# Patient Record
Sex: FEMALE | Race: WHITE | URBAN - METROPOLITAN AREA
[De-identification: names, ages, dates, MRNs, and addresses within clinical notes are randomized per-mention and may not be internally consistent; named-entity substitution may affect disease eponyms.]

---

## 2022-05-26 ENCOUNTER — OFFICE VISIT (OUTPATIENT)
Dept: OBGYN CLINIC | Age: 22
End: 2022-05-26
Payer: COMMERCIAL

## 2022-05-26 VITALS
WEIGHT: 116.4 LBS | DIASTOLIC BLOOD PRESSURE: 60 MMHG | HEIGHT: 67 IN | BODY MASS INDEX: 18.27 KG/M2 | SYSTOLIC BLOOD PRESSURE: 108 MMHG

## 2022-05-26 DIAGNOSIS — N94.6 DYSMENORRHEA: Primary | ICD-10-CM

## 2022-05-26 PROCEDURE — 99214 OFFICE O/P EST MOD 30 MIN: CPT | Performed by: OBSTETRICS & GYNECOLOGY

## 2022-05-26 RX ORDER — TRANEXAMIC ACID 650 1/1
1300 TABLET ORAL 3 TIMES DAILY PRN
Qty: 30 TABLET | Refills: 5 | Status: SHIPPED | OUTPATIENT
Start: 2022-05-26

## 2022-05-26 RX ORDER — MEFENAMIC ACID 250 MG/1
1 CAPSULE ORAL EVERY 6 HOURS PRN
Qty: 28 CAPSULE | Refills: 3 | Status: SHIPPED | OUTPATIENT
Start: 2022-05-26

## 2022-05-26 ASSESSMENT — PATIENT HEALTH QUESTIONNAIRE - PHQ9
SUM OF ALL RESPONSES TO PHQ QUESTIONS 1-9: 0
1. LITTLE INTEREST OR PLEASURE IN DOING THINGS: 0
2. FEELING DOWN, DEPRESSED OR HOPELESS: 0
SUM OF ALL RESPONSES TO PHQ QUESTIONS 1-9: 0
SUM OF ALL RESPONSES TO PHQ9 QUESTIONS 1 & 2: 0

## 2022-05-26 NOTE — PROGRESS NOTES
Sydnee Olguin  is a 25 y.o. female, g0 . Patient's last menstrual period was 2022 (within days). , who is being seen for follow up after stopping birth control. She was last seen 24 for dysmenorrhea and was given rx for crystal. She took this for 3 full months and experienced worsening depression, spent 3 full days crying, picking fights with people, and she stopped taking this around 2 weeks ago. For the first 2 months she did have brown spotting for 2 weeks but this lessened 3rd month. Really did not need meds for cramping. Still not sexually active. If at all possible, would like to avoid hormones. Discussed scheduled nsaids and lysteda that she can take anticipated heavy days of flow. Discussed mechanism of lysteda. 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 Wants to be an OT. HISTORY:    OB History        0    Para        Term   0       0    AB   0    Living   0       SAB        IAB        Ectopic        Molar        Multiple        Live Births                  GYN History         Sexual History:   Social History     Substance and Sexual Activity   Sexual Activity Never          has a past medical history of Dysmenorrhea. .    No past surgical history on file.     Her current meds are:    Current Outpatient Medications:     tranexamic acid (LYSTEDA) 650 MG TABS tablet, Take 2 tablets by mouth 3 times daily as needed (heavy menstrual flow) Max 5 days, Disp: 30 tablet, Rfl: 5    Mefenamic Acid (PONSTEL) 250 MG CAPS, Take 1 tablet by mouth every 6 hours as needed (cramping) 2 tabs initially then 1 every 6 hours prn for max 7 days, Disp: 28 capsule, Rfl: 3     Review of Systems - Negative except hpi      PHYSICAL EXAM:  /60 (Site: Right Upper Arm, Position: Sitting)   Ht 5' 7\" (1.702 m)   Wt 116 lb 6.4 oz (52.8 kg)   LMP 2022 (Within Days)   BMI 18.23 kg/m²   Physical Exam  General: well nourished well developed female in nad     ASSESSMENT / PLAN:  Diagnoses and all orders for this visit:    Dysmenorrhea    Other orders  -     tranexamic acid (LYSTEDA) 650 MG TABS tablet; Take 2 tablets by mouth 3 times daily as needed (heavy menstrual flow) Max 5 days  -     Mefenamic Acid (PONSTEL) 250 MG CAPS; Take 1 tablet by mouth every 6 hours as needed (cramping) 2 tabs initially then 1 every 6 hours prn for max 7 days      Will send in sharon / [facility]. Recheck 4-6 months. If doing well, can cancel appt.       Chantal Alt, DO

## 2023-07-31 SDOH — ECONOMIC STABILITY: TRANSPORTATION INSECURITY
IN THE PAST 12 MONTHS, HAS LACK OF TRANSPORTATION KEPT YOU FROM MEETINGS, WORK, OR FROM GETTING THINGS NEEDED FOR DAILY LIVING?: NO

## 2023-07-31 SDOH — ECONOMIC STABILITY: INCOME INSECURITY: HOW HARD IS IT FOR YOU TO PAY FOR THE VERY BASICS LIKE FOOD, HOUSING, MEDICAL CARE, AND HEATING?: NOT VERY HARD

## 2023-07-31 SDOH — ECONOMIC STABILITY: HOUSING INSECURITY
IN THE LAST 12 MONTHS, WAS THERE A TIME WHEN YOU DID NOT HAVE A STEADY PLACE TO SLEEP OR SLEPT IN A SHELTER (INCLUDING NOW)?: NO

## 2023-07-31 SDOH — ECONOMIC STABILITY: FOOD INSECURITY: WITHIN THE PAST 12 MONTHS, THE FOOD YOU BOUGHT JUST DIDN'T LAST AND YOU DIDN'T HAVE MONEY TO GET MORE.: NEVER TRUE

## 2023-07-31 SDOH — ECONOMIC STABILITY: FOOD INSECURITY: WITHIN THE PAST 12 MONTHS, YOU WORRIED THAT YOUR FOOD WOULD RUN OUT BEFORE YOU GOT MONEY TO BUY MORE.: NEVER TRUE

## 2023-08-03 ENCOUNTER — OFFICE VISIT (OUTPATIENT)
Dept: OBGYN CLINIC | Age: 23
End: 2023-08-03

## 2023-08-03 VITALS
WEIGHT: 116.2 LBS | DIASTOLIC BLOOD PRESSURE: 70 MMHG | SYSTOLIC BLOOD PRESSURE: 128 MMHG | HEIGHT: 67 IN | BODY MASS INDEX: 18.24 KG/M2

## 2023-08-03 DIAGNOSIS — Z01.419 WELL WOMAN EXAM: Primary | ICD-10-CM

## 2023-08-03 DIAGNOSIS — Z13.89 SCREENING FOR GENITOURINARY CONDITION: ICD-10-CM

## 2023-08-03 NOTE — PROGRESS NOTES
HPI: Ms. Vanita Tanner is a 23 y.o. female [address] [facility]. who is here today for a well woman exam. She complains of dysmenorrhea. Patient reports that her last period made her late for work because she could not get the cramps under control. Just got engaged. Would like to discuss birth control. Thinking about an IUD. She is getting  next year. Date Performed Result   PAP NA NA   Mammogram NA NA   Colonoscopy NA NA   Dexa NA NA       OB History          0    Para   0    Term   0       0    AB   0    Living   0         SAB   0    IAB   0    Ectopic   0    Molar   0    Multiple   0    Live Births   0              GYN History     Patient's last menstrual period was 2023 (exact date). Cycle Length 29 Lasting 7  negative dysmenorrhea; negative postcoital bleeding    Past Medical History:   Diagnosis Date    Dysmenorrhea      No past surgical history on file. No Known Allergies    No current outpatient medications on file. No current facility-administered medications for this visit. Social History     Socioeconomic History    Marital status: Single     Spouse name: Not on file    Number of children: Not on file    Years of education: Not on file    Highest education level: Not on file   Occupational History    Wants to go to OT school.      Tobacco Use    Smoking status: Never    Smokeless tobacco: Never   Substance and Sexual Activity    Alcohol use: Never    Drug use: Never    Sexual activity: Never   Other Topics Concern       Social History Narrative    Not on file     Social Determinants of Health     Financial Resource Strain: Not on file   Food Insecurity: Not on file   Transportation Needs: Not on file   Physical Activity: Not on file   Stress: Not on file   Social Connections: Not on file   Intimate Partner Violence: Not on file   Housing Stability: Not on file     Family History   Problem Relation Age of Onset    No Known Problems Mother     No Known Problems Father

## 2023-08-15 ENCOUNTER — TELEPHONE (OUTPATIENT)
Dept: OBGYN CLINIC | Age: 23
End: 2023-08-15

## 2023-08-15 NOTE — TELEPHONE ENCOUNTER
Patient called stating that she received a bill in the mail for her appointment, however, when she looks on [employer] it says that she does not owe a bill. Left detailed vm for pt with billing office phone number and advised her to call them. Advised her to contact the office PRN.

## 2023-09-20 ENCOUNTER — TELEPHONE (OUTPATIENT)
Dept: OBGYN CLINIC | Age: 23
End: 2023-09-20

## 2023-09-20 DIAGNOSIS — Z30.430 ENCOUNTER FOR IUD INSERTION: Primary | ICD-10-CM

## 2023-09-20 RX ORDER — MISOPROSTOL 200 UG/1
TABLET ORAL
Qty: 2 TABLET | Refills: 0 | Status: SHIPPED | OUTPATIENT
Start: 2023-09-20

## 2023-09-20 NOTE — TELEPHONE ENCOUNTER
Patient called to check on the status of her IUD. IUD has been received in office and patient is scheduled for insertion on 10/2/23. Per Dr. Joselyn Wakefield note she needs and ultrasound only appointment prior to the insertion and will need cytotec sent in for the insertion. Routed to front office staff to schedule for US only appointment.

## 2023-09-20 NOTE — TELEPHONE ENCOUNTER
Orders Placed This Encounter    miSOPROStol (CYTOTEC) 200 MCG tablet     Sig: Take 1 tablet by mouth and insert 1 tablet vaginally night before IUD insertion     Dispense:  2 tablet     Refill:  0

## 2023-10-17 ENCOUNTER — PROCEDURE VISIT (OUTPATIENT)
Dept: OBGYN CLINIC | Age: 23
End: 2023-10-17
Payer: COMMERCIAL

## 2023-10-17 VITALS
DIASTOLIC BLOOD PRESSURE: 70 MMHG | BODY MASS INDEX: 17.84 KG/M2 | SYSTOLIC BLOOD PRESSURE: 122 MMHG | HEIGHT: 67 IN | WEIGHT: 113.7 LBS

## 2023-10-17 DIAGNOSIS — N94.6 DYSMENORRHEA: Primary | ICD-10-CM

## 2023-10-17 PROCEDURE — 76830 TRANSVAGINAL US NON-OB: CPT | Performed by: OBSTETRICS & GYNECOLOGY

## 2023-10-17 NOTE — PROGRESS NOTES
Transvag GYN - dysmenorrhea   Ut- anteverted and heterogeneous, c/w very mild adenomyosis, arcuate  Endo- 5.9mm, hypervascular (pt did just finish cycle yesterday), no intracavitary masses visualized  ROV- CLC  LOV- appears wnl  Bilateral adnexas appear wnl  Mild amount of free fluid present adjacent to ROV

## 2023-10-17 NOTE — PROGRESS NOTES
IUD INSERTION    Birth Control:  Nothing currently     LMP: No LMP recorded. UCG:  Negative       Discussed us with pt - arcuate uterus but otherwise normal cavity. Will proceed with placing iud. Transvag GYN - dysmenorrhea   Ut- anteverted and heterogeneous, c/w very mild adenomyosis, arcuate  Endo- 5.9mm, hypervascular (pt did just finish cycle yesterday), no intracavitary masses visualized  ROV- CLC  LOV- appears wnl  Bilateral adnexas appear wnl  Mild amount of free fluid present adjacent to ROV     Patient was counseled regarding 99% efficacy, risk of irregular bleeding for the first 3-6 months after insertion, small risk of expulsion or uterine perforation with need for surgical removal.  Patient signed consent. Speculum was inserted to visualize cervix. Cervix was cleansed with the zepherine. Single toothed tenaculum was placed on anterior lip of cervix. Uterus sounded to 7 cm.    mirena IUD was inserted per the 's instructions. String was cut at 2cm from the OS. Patient tolerated procedure well. Tenaculum was removed. Patient was instructed on how to feel for strings and told to check for them every few days in the next week or so and then once per month. She is aware that if she cannot feel them she needs to contact office. Patient instructed on warning signs of infection in the next few weeks. She is to call for severe pain, fever >100.5, or significantly heavy bleeding. She was told to be sexually abstinent for the next 24-48 hours     She had us before. She is aware that for up to 6 months could have aub Leotha Balder /cramping. She is aware.   Rtc prn and for annual exam

## 2023-10-30 ENCOUNTER — PATIENT MESSAGE (OUTPATIENT)
Dept: OBGYN CLINIC | Age: 23
End: 2023-10-30

## 2023-10-30 DIAGNOSIS — L70.8 OTHER ACNE: Primary | ICD-10-CM
